# Patient Record
Sex: MALE | URBAN - METROPOLITAN AREA
[De-identification: names, ages, dates, MRNs, and addresses within clinical notes are randomized per-mention and may not be internally consistent; named-entity substitution may affect disease eponyms.]

---
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
## 2022-12-23 NOTE — PROCEDURE: TREATMENT REGIMEN
21-Dec-2022 07:15
23-Dec-2022 15:05
Detail Level: Detailed
Plan: Discussed waiting until 6 months after Accutane to look at treating acne scarring
